# Patient Record
Sex: MALE | Race: WHITE | NOT HISPANIC OR LATINO | Employment: OTHER | URBAN - METROPOLITAN AREA
[De-identification: names, ages, dates, MRNs, and addresses within clinical notes are randomized per-mention and may not be internally consistent; named-entity substitution may affect disease eponyms.]

---

## 2021-09-27 ENCOUNTER — HISTORICAL (OUTPATIENT)
Dept: ADMINISTRATIVE | Facility: HOSPITAL | Age: 36
End: 2021-09-27

## 2022-04-11 ENCOUNTER — HISTORICAL (OUTPATIENT)
Dept: ADMINISTRATIVE | Facility: HOSPITAL | Age: 37
End: 2022-04-11

## 2022-04-28 VITALS
DIASTOLIC BLOOD PRESSURE: 82 MMHG | BODY MASS INDEX: 32.77 KG/M2 | OXYGEN SATURATION: 95 % | WEIGHT: 255.31 LBS | SYSTOLIC BLOOD PRESSURE: 128 MMHG | HEIGHT: 74 IN

## 2023-09-07 ENCOUNTER — HOSPITAL ENCOUNTER (EMERGENCY)
Facility: HOSPITAL | Age: 38
Discharge: HOME | End: 2023-09-08
Attending: STUDENT IN AN ORGANIZED HEALTH CARE EDUCATION/TRAINING PROGRAM
Payer: MEDICAID

## 2023-09-07 DIAGNOSIS — L03.113 CELLULITIS OF RIGHT UPPER EXTREMITY: Primary | ICD-10-CM

## 2023-09-07 DIAGNOSIS — R52 PAIN: ICD-10-CM

## 2023-09-07 DIAGNOSIS — M79.601 ARM PAIN, RIGHT: ICD-10-CM

## 2023-09-07 LAB
BASOPHILS # BLD AUTO: 0.04 K/UL (ref 0–0.2)
BASOPHILS NFR BLD: 0.5 % (ref 0–1.9)
DIFFERENTIAL METHOD: ABNORMAL
EOSINOPHIL # BLD AUTO: 0.4 K/UL (ref 0–0.5)
EOSINOPHIL NFR BLD: 4.5 % (ref 0–8)
ERYTHROCYTE [DISTWIDTH] IN BLOOD BY AUTOMATED COUNT: 12.6 % (ref 11.5–14.5)
HCT VFR BLD AUTO: 37.5 % (ref 40–54)
HGB BLD-MCNC: 12.6 G/DL (ref 14–18)
IMM GRANULOCYTES # BLD AUTO: 0.04 K/UL (ref 0–0.04)
IMM GRANULOCYTES NFR BLD AUTO: 0.5 % (ref 0–0.5)
LYMPHOCYTES # BLD AUTO: 2.9 K/UL (ref 1–4.8)
LYMPHOCYTES NFR BLD: 34.5 % (ref 18–48)
MCH RBC QN AUTO: 29.1 PG (ref 27–31)
MCHC RBC AUTO-ENTMCNC: 33.6 G/DL (ref 32–36)
MCV RBC AUTO: 87 FL (ref 82–98)
MONOCYTES # BLD AUTO: 0.6 K/UL (ref 0.3–1)
MONOCYTES NFR BLD: 7.5 % (ref 4–15)
NEUTROPHILS # BLD AUTO: 4.4 K/UL (ref 1.8–7.7)
NEUTROPHILS NFR BLD: 52.5 % (ref 38–73)
NRBC BLD-RTO: 0 /100 WBC
PLATELET # BLD AUTO: 237 K/UL (ref 150–450)
PMV BLD AUTO: 9.1 FL (ref 9.2–12.9)
RBC # BLD AUTO: 4.33 M/UL (ref 4.6–6.2)
WBC # BLD AUTO: 8.43 K/UL (ref 3.9–12.7)

## 2023-09-07 PROCEDURE — 80053 COMPREHEN METABOLIC PANEL: CPT | Performed by: STUDENT IN AN ORGANIZED HEALTH CARE EDUCATION/TRAINING PROGRAM

## 2023-09-07 PROCEDURE — 63600175 PHARM REV CODE 636 W HCPCS: Performed by: STUDENT IN AN ORGANIZED HEALTH CARE EDUCATION/TRAINING PROGRAM

## 2023-09-07 PROCEDURE — 85025 COMPLETE CBC W/AUTO DIFF WBC: CPT | Performed by: STUDENT IN AN ORGANIZED HEALTH CARE EDUCATION/TRAINING PROGRAM

## 2023-09-07 PROCEDURE — 96375 TX/PRO/DX INJ NEW DRUG ADDON: CPT

## 2023-09-07 PROCEDURE — 96365 THER/PROPH/DIAG IV INF INIT: CPT

## 2023-09-07 PROCEDURE — 99285 EMERGENCY DEPT VISIT HI MDM: CPT | Mod: 25

## 2023-09-07 PROCEDURE — 25000003 PHARM REV CODE 250: Performed by: STUDENT IN AN ORGANIZED HEALTH CARE EDUCATION/TRAINING PROGRAM

## 2023-09-07 PROCEDURE — 83605 ASSAY OF LACTIC ACID: CPT | Performed by: STUDENT IN AN ORGANIZED HEALTH CARE EDUCATION/TRAINING PROGRAM

## 2023-09-07 RX ORDER — MORPHINE SULFATE 4 MG/ML
4 INJECTION, SOLUTION INTRAMUSCULAR; INTRAVENOUS
Status: COMPLETED | OUTPATIENT
Start: 2023-09-07 | End: 2023-09-07

## 2023-09-07 RX ADMIN — CEFTRIAXONE SODIUM 1 G: 1 INJECTION, POWDER, FOR SOLUTION INTRAMUSCULAR; INTRAVENOUS at 11:09

## 2023-09-07 RX ADMIN — SODIUM CHLORIDE 1000 ML: 9 INJECTION, SOLUTION INTRAVENOUS at 11:09

## 2023-09-07 RX ADMIN — MORPHINE SULFATE 4 MG: 4 INJECTION INTRAVENOUS at 11:09

## 2023-09-08 VITALS
WEIGHT: 225 LBS | HEART RATE: 71 BPM | BODY MASS INDEX: 28.88 KG/M2 | TEMPERATURE: 98 F | DIASTOLIC BLOOD PRESSURE: 66 MMHG | RESPIRATION RATE: 20 BRPM | HEIGHT: 74 IN | OXYGEN SATURATION: 96 % | SYSTOLIC BLOOD PRESSURE: 123 MMHG

## 2023-09-08 LAB
ALBUMIN SERPL BCP-MCNC: 3.6 G/DL (ref 3.5–5.2)
ALP SERPL-CCNC: 62 U/L (ref 55–135)
ALT SERPL W/O P-5'-P-CCNC: 39 U/L (ref 10–44)
ANION GAP SERPL CALC-SCNC: 10 MMOL/L (ref 8–16)
AST SERPL-CCNC: 46 U/L (ref 10–40)
BILIRUB SERPL-MCNC: 0.2 MG/DL (ref 0.1–1)
BUN SERPL-MCNC: 17 MG/DL (ref 6–20)
CALCIUM SERPL-MCNC: 9.4 MG/DL (ref 8.7–10.5)
CHLORIDE SERPL-SCNC: 107 MMOL/L (ref 95–110)
CO2 SERPL-SCNC: 23 MMOL/L (ref 23–29)
CREAT SERPL-MCNC: 0.9 MG/DL (ref 0.5–1.4)
EST. GFR  (NO RACE VARIABLE): >60 ML/MIN/1.73 M^2
GLUCOSE SERPL-MCNC: 108 MG/DL (ref 70–110)
LACTATE SERPL-SCNC: 0.7 MMOL/L (ref 0.5–2.2)
POTASSIUM SERPL-SCNC: 3.9 MMOL/L (ref 3.5–5.1)
PROT SERPL-MCNC: 7 G/DL (ref 6–8.4)
SODIUM SERPL-SCNC: 140 MMOL/L (ref 136–145)

## 2023-09-08 PROCEDURE — 96376 TX/PRO/DX INJ SAME DRUG ADON: CPT

## 2023-09-08 PROCEDURE — 96361 HYDRATE IV INFUSION ADD-ON: CPT

## 2023-09-08 PROCEDURE — 63600175 PHARM REV CODE 636 W HCPCS: Performed by: STUDENT IN AN ORGANIZED HEALTH CARE EDUCATION/TRAINING PROGRAM

## 2023-09-08 RX ORDER — MORPHINE SULFATE 2 MG/ML
6 INJECTION, SOLUTION INTRAMUSCULAR; INTRAVENOUS
Status: COMPLETED | OUTPATIENT
Start: 2023-09-08 | End: 2023-09-08

## 2023-09-08 RX ORDER — ACETAMINOPHEN 500 MG
500 TABLET ORAL EVERY 6 HOURS PRN
Qty: 20 TABLET | Refills: 0 | Status: SHIPPED | OUTPATIENT
Start: 2023-09-08

## 2023-09-08 RX ORDER — KETOROLAC TROMETHAMINE 10 MG/1
10 TABLET, FILM COATED ORAL EVERY 6 HOURS PRN
Qty: 20 TABLET | Refills: 0 | Status: SHIPPED | OUTPATIENT
Start: 2023-09-08 | End: 2023-09-13

## 2023-09-08 RX ORDER — CLINDAMYCIN HYDROCHLORIDE 150 MG/1
450 CAPSULE ORAL EVERY 8 HOURS
Qty: 63 CAPSULE | Refills: 0 | Status: SHIPPED | OUTPATIENT
Start: 2023-09-08 | End: 2023-09-15

## 2023-09-08 RX ORDER — KETOROLAC TROMETHAMINE 30 MG/ML
15 INJECTION, SOLUTION INTRAMUSCULAR; INTRAVENOUS
Status: COMPLETED | OUTPATIENT
Start: 2023-09-08 | End: 2023-09-08

## 2023-09-08 RX ADMIN — KETOROLAC TROMETHAMINE 15 MG: 30 INJECTION, SOLUTION INTRAMUSCULAR; INTRAVENOUS at 01:09

## 2023-09-08 RX ADMIN — MORPHINE SULFATE 6 MG: 2 INJECTION, SOLUTION INTRAMUSCULAR; INTRAVENOUS at 01:09

## 2023-09-08 NOTE — ED PROVIDER NOTES
Encounter Date: 9/7/2023       History     Chief Complaint   Patient presents with    Arm Swelling     Complaining of right arm swelling.  Arm is swollen, red, painful and  hot to touch.  States this started Monday. + radial pulse.     38-year-old male with no significant past medical history presents to the emergency department with pain and swelling to his right upper extremity.  Patient reports 3 days ago he scratched his elbow a washing machine and did not think much of it at that time.  He reports progressively having mild pain since the incident.  States he went to [place] for a vacation and went snorkeling significant pain, swelling and redness to his elbow and upper arm.  Reports having subjective fevers and chills earlier today and took Tylenol at that time disease (12 hours ago).  He denies any numbness or tingling denies any nausea or vomiting.  He reports noticing blisters starting to form on the posterior aspect of his upper arm so presented to the emergency department for evaluation.  Denies any chest pain cough or hemoptysis.  Denies any lower extremity swelling.    The history is provided by the patient and the spouse.     Review of patient's allergies indicates:  No Known Allergies  No past medical history on file.  No past surgical history on file.  No family history on file.     Review of Systems   Constitutional:  Positive for chills and fever.   HENT:  Negative for congestion and rhinorrhea.    Eyes:  Negative for pain.   Respiratory:  Negative for cough and shortness of breath.    Cardiovascular:  Negative for chest pain and leg swelling.   Gastrointestinal:  Negative for abdominal pain, nausea and vomiting.   Endocrine: Negative for polyuria.   Genitourinary:  Negative for dysuria and hematuria.   Musculoskeletal:  Positive for joint swelling. Negative for gait problem and neck pain.   Skin:  Negative for rash.   Allergic/Immunologic: Negative for immunocompromised state.   Neurological:   Negative for weakness and headaches.       Physical Exam     Initial Vitals   BP Pulse Resp Temp SpO2   09/07/23 2315 09/07/23 2315 09/07/23 2315 09/07/23 2313 09/07/23 2315   131/70 83 18 98.1 °F (36.7 °C) 98 %      MAP       --                Physical Exam    Nursing note and vitals reviewed.  Constitutional: He is not diaphoretic. No distress.   HENT:   Head: Normocephalic and atraumatic.   Eyes: Conjunctivae and EOM are normal. Pupils are equal, round, and reactive to light.   Neck:   Normal range of motion.  Cardiovascular:  Regular rhythm.           Pulses:       Radial pulses are 2+ on the right side and 2+ on the left side.        Posterior tibial pulses are 2+ on the right side and 2+ on the left side.   Pulmonary/Chest: Breath sounds normal. No respiratory distress.   Abdominal: Abdomen is soft. Bowel sounds are normal. He exhibits no distension. There is no abdominal tenderness. There is no rebound and no guarding.   Musculoskeletal:         General: Normal range of motion.      Right upper arm: Swelling and tenderness present.      Right elbow: Swelling present. Tenderness present.      Left elbow: Normal.      Cervical back: Normal range of motion.      Comments: Large area of induration and erythema to the medial posterior aspect of his upper arm, tender to palpation, overlying both excoriations.     Lymphadenopathy:     He has no cervical adenopathy.   Neurological: He is alert and oriented to person, place, and time.   Skin: Skin is warm. Capillary refill takes less than 2 seconds.   Psychiatric: He has a normal mood and affect. His behavior is normal.         ED Course   Procedures  Labs Reviewed   CBC W/ AUTO DIFFERENTIAL - Abnormal; Notable for the following components:       Result Value    RBC 4.33 (*)     Hemoglobin 12.6 (*)     Hematocrit 37.5 (*)     MPV 9.1 (*)     All other components within normal limits   COMPREHENSIVE METABOLIC PANEL - Abnormal; Notable for the following components:     AST 46 (*)     All other components within normal limits   LACTIC ACID, PLASMA          Imaging Results              CT Arm Elbow Without Contrast Right (Final result)  Result time 09/08/23 00:22:26      Final result by Colby Lucero DO (09/08/23 00:22:26)                   Impression:      Subcutaneous soft tissue edema of the elbow, concerning for cellulitis.  No focal fluid collection or soft tissue gas.      Electronically signed by: Colby Lucero  Date:    09/08/2023  Time:    00:22               Narrative:    EXAMINATION:  CT ARM ELBOW WITHOUT CONTRAST RIGHT    CLINICAL HISTORY:  Soft tissue infection suspected, elbow, xray done;    TECHNIQUE:  Axial CT images of the right elbow with sagittal and coronal reformats without intravenous contrast.    COMPARISON:  None    FINDINGS:  There is motion artifact.    Bone: There is no evidence of acute fracture or dislocation.  There is no aggressive osseous lesion.  There is no CT evidence of acute osteomyelitis.    Articulations: Unremarkable.  No significant joint space narrowing or cartilage loss.  There is no joint effusion.    Soft tissues: There is soft tissue edema the elbow, predominantly in the dorsal subcutaneous tissues.  There is no evidence of deep fascial or muscular edema.  There is no evidence of soft tissue gas.  There is no focal fluid collection on this noncontrast CT.    Miscellaneous: Neurovascular structures are grossly unremarkable.  Partially imaged portions of the brain parenchyma and calvarium are grossly unremarkable.                        Final result by Colby Lucero DO (09/08/23 00:22:26)                   Impression:      Subcutaneous soft tissue edema of the elbow, concerning for cellulitis.  No focal fluid collection or soft tissue gas.      Electronically signed by: Colby Lucero  Date:    09/08/2023  Time:    00:22               Narrative:    EXAMINATION:  CT ARM ELBOW WITHOUT CONTRAST RIGHT    CLINICAL HISTORY:  Soft tissue  infection suspected, elbow, xray done;    TECHNIQUE:  Axial CT images of the right elbow with sagittal and coronal reformats without intravenous contrast.    COMPARISON:  None    FINDINGS:  There is motion artifact.    Bone: There is no evidence of acute fracture or dislocation.  There is no aggressive osseous lesion.  There is no CT evidence of acute osteomyelitis.    Articulations: Unremarkable.  No significant joint space narrowing or cartilage loss.  There is no joint effusion.    Soft tissues: There is soft tissue edema the elbow, predominantly in the dorsal subcutaneous tissues.  There is no evidence of deep fascial or muscular edema.  There is no evidence of soft tissue gas.  There is no focal fluid collection on this noncontrast CT.    Miscellaneous: Neurovascular structures are grossly unremarkable.  Partially imaged portions of the brain parenchyma and calvarium are grossly unremarkable.                                       Medications   morphine injection 4 mg (4 mg Intravenous Given 9/7/23 2350)   sodium chloride 0.9% bolus 1,000 mL 1,000 mL (0 mLs Intravenous Stopped 9/8/23 0101)   cefTRIAXone (ROCEPHIN) 1 g in dextrose 5 % in water (D5W) 100 mL IVPB (MB+) (0 g Intravenous Stopped 9/8/23 0025)   morphine injection 6 mg (6 mg Intravenous Given 9/8/23 0102)   ketorolac injection 15 mg (15 mg Intravenous Given 9/8/23 0102)     Medical Decision Making:   Initial Assessment:   38-year-old male with no significant past medical history presents with pain to right upper extremity.  Patient in no acute distress exam notable for focal induration with overlying erythema in the right upper extremity.  Neurovascularly intact distally.  Given location will obtain CT scan to assess for possible septic joint versus deep space infection.  No crepitus or fluctuance appreciated on exam.  Will obtain lab work to assess for any signs of systemic infection and give IV fluids as well as antibiotics.  Will reassess after labs  and imaging and pain control.  Differential Diagnosis:   Differential Diagnosis includes, but is not limited to:  Cellulitis, abscess, necrotizing fasciitis, osteomyelitis, septic joint, MRSA, DVT, drug eruption, allergic/contact dermatitis, EM/SJS, viral exanthem, local trauma/contusion    Clinical Tests:   Lab Tests: Ordered and Reviewed  Radiological Study: Ordered and Reviewed               ED Course as of 09/08/23 0136   Thu Sep 07, 2023   2358 CBC without significant leukocytosis, anemia (at baseline), or platelet abnormalities.   [AS]   Fri Sep 08, 2023   0038 Chem 14 negative for hypo-or hyper natremia, kalemia , chloridemia, or other electrolyte abnormalities; BUN and creatinine (at baseline), ALT and AST were within normal limits indicating normal liver function.  LA wnl  [AS]   0038 CT Arm Elbow Without Contrast Right  Subcutaneous soft tissue edema of the elbow, concerning for cellulitis.  No focal fluid collection or soft tissue gas. [AS]   0101 Pt is currently stable for discharge.   I see no indication of an emergent process beyond that addressed during our encounter but have duly counseled the patient/family regarding the need for prompt follow-up as well as the indications that should prompt immediate return to the emergency room should new or worrisome developments occur. I discussed the ED work up and diagnostic findings with the patient. The patient/family has been provided with verbal and printed direction regarding our final diagnosis(es) as well as instructions regarding use of OTC and/or Rx medications intended to manage the patient's aforementioned conditions. The patient/family verbalized an understanding. The patient/family is asked if there are any questions or concerns. We discuss the case, until all issues are addressed to the patient/family's satisfaction. Patient/family understands and is agreeable to the plan.  [AS]      ED Course User Index  [AS] Jj Webb MD                  Clinical Impression:   Final diagnoses:  [R52] Pain  [L03.113] Cellulitis of right upper extremity (Primary)  [M79.601] Arm pain, right          ED Disposition Condition    Discharge Stable          ED Prescriptions       Medication Sig Dispense Start Date End Date Auth. Provider    clindamycin (CLEOCIN) 150 MG capsule Take 3 capsules (450 mg total) by mouth every 8 (eight) hours. for 7 days 63 capsule 9/8/2023 9/15/2023 Jj Webb MD    ketorolac (TORADOL) 10 mg tablet Take 1 tablet (10 mg total) by mouth every 6 (six) hours as needed for Pain. 20 tablet 9/8/2023 9/13/2023 Jj Webb MD    acetaminophen (TYLENOL) 500 MG tablet Take 1 tablet (500 mg total) by mouth every 6 (six) hours as needed for Pain. 20 tablet 9/8/2023 -- Jj Webb MD          Follow-up Information       Follow up With Specialties Details Why Contact [facility] - Emergency Dept Emergency Medicine  If symptoms worsen [address] [facility] [address] [phone]    primary care doctor  Schedule an appointment as soon as possible for a visit  for reassesment             DISCLAIMER: This note was prepared with [email or web address] voice recognition transcription software. Garbled syntax, mangled pronouns, and other bizarre constructions may be attributed to that software system.     Jj Webb MD  09/08/23 0136

## 2023-09-08 NOTE — ED NOTES
RN first encounter with pt. Pt ambulatory to room. Pt reports right arm pain and swelling that began on Monday. Pt states about two weeks ago he slammed his right elbow down, pt denies any other known injuries. Pt reports on Tuesday his arm became significantly swollen. Pts arm is noted to be swollen and red. Pt denies fever. Pt rates pain 7/10.

## 2023-09-08 NOTE — DISCHARGE INSTRUCTIONS
Thank you for coming to our Emergency Department today. It is important to remember that some problems are difficult to diagnose and may not be found during your first visit. Be sure to follow up with your primary care doctor and review any labs/imaging that was performed with them. If you do not have a primary care doctor, you may contact the one listed on your discharge paperwork or you may also call the [facility] Appointment Desk at [phone] to schedule an appointment with one.     All medications may potentially have side effects and it is impossible to predict which medications may give you side effects. If you feel that you are having a negative effect of any medication you should immediately stop taking them and seek medical attention.    Return to the ER with any questions/concerns, new/concerning symptoms, worsening or failure to improve. Do not drive or make any important decisions for 24 hours if you have received any pain medications, sedatives or mood altering drugs during your ER visit.

## 2023-11-15 ENCOUNTER — HOSPITAL ENCOUNTER (EMERGENCY)
Facility: HOSPITAL | Age: 38
Discharge: HOME | End: 2023-11-15
Attending: EMERGENCY MEDICINE
Payer: MEDICAID

## 2023-11-15 VITALS
RESPIRATION RATE: 16 BRPM | OXYGEN SATURATION: 97 % | BODY MASS INDEX: 28.89 KG/M2 | SYSTOLIC BLOOD PRESSURE: 168 MMHG | TEMPERATURE: 98 F | DIASTOLIC BLOOD PRESSURE: 93 MMHG | HEART RATE: 67 BPM | HEIGHT: 74 IN

## 2023-11-15 DIAGNOSIS — S61.211A LACERATION OF LEFT INDEX FINGER WITHOUT FOREIGN BODY WITHOUT DAMAGE TO NAIL, INITIAL ENCOUNTER: Primary | ICD-10-CM

## 2023-11-15 PROCEDURE — 12001 RPR S/N/AX/GEN/TRNK 2.5CM/<: CPT

## 2023-11-15 PROCEDURE — 99283 EMERGENCY DEPT VISIT LOW MDM: CPT | Mod: 25

## 2023-11-15 PROCEDURE — 25000003 PHARM REV CODE 250: Performed by: NURSE PRACTITIONER

## 2023-11-15 RX ORDER — LIDOCAINE HYDROCHLORIDE 10 MG/ML
10 INJECTION, SOLUTION EPIDURAL; INFILTRATION; INTRACAUDAL; PERINEURAL
Status: COMPLETED | OUTPATIENT
Start: 2023-11-15 | End: 2023-11-15

## 2023-11-15 RX ORDER — HYDROCODONE BITARTRATE AND ACETAMINOPHEN 10; 325 MG/1; MG/1
1 TABLET ORAL
Status: COMPLETED | OUTPATIENT
Start: 2023-11-15 | End: 2023-11-15

## 2023-11-15 RX ADMIN — BACITRACIN ZINC, NEOMYCIN SULFATE , POLYMYXIN B SULFATE. 1 EACH: 400; 3.5; 5 OINTMENT TOPICAL at 10:11

## 2023-11-15 RX ADMIN — HYDROCODONE BITARTRATE AND ACETAMINOPHEN 1 TABLET: 10; 325 TABLET ORAL at 08:11

## 2023-11-15 RX ADMIN — LIDOCAINE HYDROCHLORIDE 100 MG: 10 INJECTION, SOLUTION EPIDURAL; INFILTRATION; INTRACAUDAL; PERINEURAL at 08:11

## 2023-11-15 NOTE — ED PROVIDER NOTES
Encounter Date: 11/15/2023       History     Chief Complaint   Patient presents with    Hand Pain     Heavy trailer tongue fell/crushed left index finger prior to arrival. Laceration present. Sensation to tip of finger intact. Pt awake,alert     The patient presents with injury to left index finger. A trailor fell on his finger just prior to arrival. He has a laceration to pad of finger. He reports that his tetanus immunization is up to date.      Review of patient's allergies indicates:  No Known Allergies  History reviewed. No pertinent past medical history.  History reviewed. No pertinent surgical history.  History reviewed. No pertinent family history.     Review of Systems   Constitutional:  Negative for fever.   HENT:  Negative for sore throat.    Respiratory:  Negative for shortness of breath.    Cardiovascular:  Negative for chest pain.   Gastrointestinal:  Negative for nausea.   Genitourinary:  Negative for dysuria.   Musculoskeletal:  Negative for back pain.   Skin:  Positive for wound. Negative for rash.   Neurological:  Negative for weakness.   Hematological:  Does not bruise/bleed easily.   All other systems reviewed and are negative.      Physical Exam     Initial Vitals [11/15/23 0834]   BP Pulse Resp Temp SpO2   (!) 123/91 97 20 97.5 °F (36.4 °C) 98 %      MAP       --         Physical Exam    Nursing note and vitals reviewed.  Constitutional: He appears well-developed and well-nourished.   HENT:   Head: Normocephalic and atraumatic.   Neck: Neck supple.   Normal range of motion.  Cardiovascular:  Normal rate, regular rhythm, normal heart sounds and intact distal pulses.           Pulmonary/Chest: Breath sounds normal.   Abdominal: Abdomen is soft. Bowel sounds are normal.   Musculoskeletal:         General: Normal range of motion.      Cervical back: Normal range of motion and neck supple.      Comments: 1cm clean linear laceration to pad of left index finger, FROM, good distal pulse, NVI      Neurological: He is alert and oriented to person, place, and time. He has normal strength.   Skin: Skin is warm and dry.   Psychiatric: He has a normal mood and affect.         ED Course   Lac Repair    Date/Time: 11/15/2023 10:28 AM    Performed by: Leonardo Encarnacion ACNP  Authorized by: Leonardo Encarnacion ACNP    Anesthesia:     Anesthesia method:  Nerve block    Block location:  Digital block left index finger    Block anesthetic:  Lidocaine 1% w/o epi    Block injection procedure:  Anatomic landmarks identified, introduced needle, incremental injection and negative aspiration for blood    Block outcome:  Anesthesia achieved  Laceration details:     Location:  Finger    Finger location:  L index finger    Length (cm):  1.3  Pre-procedure details:     Preparation:  Patient was prepped and draped in usual sterile fashion and imaging obtained to evaluate for foreign bodies  Treatment:     Area cleansed with:  Povidone-iodine and saline    Amount of cleaning:  Extensive    Irrigation solution:  Sterile saline    Irrigation method:  Syringe    Visualized foreign bodies/material removed: no    Skin repair:     Repair method:  Sutures    Suture size:  4-0    Suture material:  Nylon    Suture technique:  Simple interrupted    Number of sutures:  5  Approximation:     Approximation:  Close  Repair type:     Repair type:  Simple  Post-procedure details:     Dressing:  Antibiotic ointment and non-adherent dressing    Procedure completion:  Tolerated well, no immediate complications    Labs Reviewed - No data to display       Imaging Results              X-Ray Hand 3 view Left (Final result)  Result time 11/15/23 10:15:51      Final result by Saurav Mcallister MD (11/15/23 10:15:51)                   Impression:      Thread-like metallic density within the soft tissues adjacent to the proximal 2nd phalanx proximal aspect within the more palmar soft tissues.      Electronically signed by: Saurva  Ary  Date:    11/15/2023  Time:    10:15               Narrative:    EXAMINATION:  XR HAND COMPLETE 3 VIEW LEFT    CLINICAL HISTORY:  left index finger injury;    TECHNIQUE:  Radiographs of the left hand with AP, lateral and oblique  views.    COMPARISON:  No prior imaging available for comparison    FINDINGS:  Thread-like metallic density within the soft tissues adjacent to the proximal 2nd phalanx proximal aspect within the more palmar soft tissues.                                       Medications   HYDROcodone-acetaminophen  mg per tablet 1 tablet (1 tablet Oral Given 11/15/23 0847)   LIDOcaine (PF) 10 mg/ml (1%) injection 100 mg (100 mg Infiltration Given 11/15/23 0847)   neomycin-bacitracnZn-polymyxnB packet (1 each Topical (Top) Given 11/15/23 1015)     Medical Decision Making  The patient presents with injury to left index finger. A trailor fell on his finger just prior to arrival. He has a laceration to pad of finger. He reports that his tetanus immunization is up to date.    FB per xray at site of old injury, not related to this injury, this was discussed with patient.    Amount and/or Complexity of Data Reviewed  Radiology: ordered.    Risk  OTC drugs.  Prescription drug management.      Additional MDM:   Differential Diagnosis:   At this time differential diagnosis is but not limited to laceration, fracture, contusion              ED Course as of 11/15/23 1029   Wed Nov 15, 2023   1026 X-Ray Hand 3 view Left  Impression:     Thread-like metallic density within the soft tissues adjacent to the proximal 2nd phalanx proximal aspect within the more palmar soft tissues.      [RB]      ED Course User Index  [RB] Leonardo Encarnacion ACNP                    Clinical Impression:   Final diagnoses:  [S61.211A] Laceration of left index finger without foreign body without damage to nail, initial encounter (Primary)        ED Disposition Condition    Discharge Stable          ED Prescriptions    None        Follow-up Information       Follow up With Specialties Details Why Contact Info    [facility] - Emergency Dept Emergency Medicine  If symptoms worsen [address] [facility] [address] [phone]    have sutures removed in 7-10 days                 Leonardo Encarnacion, ACNP  11/15/23 1035